# Patient Record
Sex: MALE | Race: WHITE | Employment: FULL TIME | URBAN - METROPOLITAN AREA
[De-identification: names, ages, dates, MRNs, and addresses within clinical notes are randomized per-mention and may not be internally consistent; named-entity substitution may affect disease eponyms.]

---

## 2019-10-30 ENCOUNTER — HOSPITAL ENCOUNTER (OUTPATIENT)
Age: 29
Discharge: HOME | End: 2019-10-30
Attending: FAMILY MEDICINE
Payer: COMMERCIAL

## 2019-10-30 VITALS
HEART RATE: 69 BPM | WEIGHT: 295 LBS | OXYGEN SATURATION: 100 % | SYSTOLIC BLOOD PRESSURE: 126 MMHG | RESPIRATION RATE: 17 BRPM | TEMPERATURE: 97 F | DIASTOLIC BLOOD PRESSURE: 85 MMHG | BODY MASS INDEX: 39.96 KG/M2 | HEIGHT: 72 IN

## 2019-10-30 DIAGNOSIS — R10.9 ABDOMINAL PAIN OF UNKNOWN ETIOLOGY: Primary | ICD-10-CM

## 2019-10-30 PROCEDURE — 99202 OFFICE O/P NEW SF 15 MIN: CPT

## 2019-10-30 NOTE — ED PROVIDER NOTES
Patient Seen in: [address]      History   Patient presents with:  Nausea/Vomiting/Diarrhea (gastrointestinal)    Stated Complaint: diarrhea    HPI    Pt is here with symptoms of chills, feeling dizzy and diarrhea since oropharyngeal exudate. Eyes:      General: No scleral icterus. Extraocular Movements: Extraocular movements intact. Cardiovascular:      Rate and Rhythm: Normal rate and regular rhythm. Heart sounds: Normal heart sounds. No murmur. No gallop.

## 2019-10-30 NOTE — ED INITIAL ASSESSMENT (HPI)
Pt presents to the IC with c/o dizziness, chills/hot flashes, since Sunday. Pt notes upper abd pain that started while driving to work this morning. Diarrhea x4 episodes yesterday, none today. +nausea without emesis. abd pain is resolved.

## 2022-03-08 ENCOUNTER — HOSPITAL ENCOUNTER (OUTPATIENT)
Age: 32
Discharge: HOME | End: 2022-03-08
Payer: COMMERCIAL

## 2022-03-08 VITALS
HEART RATE: 76 BPM | OXYGEN SATURATION: 99 % | BODY MASS INDEX: 30.48 KG/M2 | WEIGHT: 225 LBS | TEMPERATURE: 98 F | RESPIRATION RATE: 18 BRPM | SYSTOLIC BLOOD PRESSURE: 146 MMHG | HEIGHT: 72 IN | DIASTOLIC BLOOD PRESSURE: 91 MMHG

## 2022-03-08 DIAGNOSIS — K64.9 HEMORRHOIDS, UNSPECIFIED HEMORRHOID TYPE: Primary | ICD-10-CM

## 2022-03-08 PROCEDURE — 99203 OFFICE O/P NEW LOW 30 MIN: CPT | Performed by: NURSE PRACTITIONER

## 2022-03-08 RX ORDER — DOCUSATE SODIUM 100 MG/1
100 CAPSULE, LIQUID FILLED ORAL 2 TIMES DAILY
Qty: 20 CAPSULE | Refills: 0 | Status: SHIPPED | OUTPATIENT
Start: 2022-03-08 | End: 2022-03-18

## 2022-03-08 RX ORDER — FLUOXETINE HYDROCHLORIDE 60 MG/1
TABLET, FILM COATED ORAL; ORAL
COMMUNITY

## 2022-03-10 ENCOUNTER — OFFICE VISIT (OUTPATIENT)
Dept: SURGERY | Facility: CLINIC | Age: 32
End: 2022-03-10
Payer: COMMERCIAL

## 2022-03-10 VITALS — TEMPERATURE: 97 F

## 2022-03-10 DIAGNOSIS — K64.5 THROMBOSED EXTERNAL HEMORRHOID: Primary | ICD-10-CM

## 2022-03-10 DIAGNOSIS — K62.89 RECTAL PAIN: ICD-10-CM

## 2022-03-10 PROCEDURE — 46320 REMOVAL OF HEMORRHOID CLOT: CPT | Performed by: SURGERY

## 2022-03-10 PROCEDURE — 46600 DIAGNOSTIC ANOSCOPY SPX: CPT | Performed by: SURGERY

## 2022-03-10 PROCEDURE — 99204 OFFICE O/P NEW MOD 45 MIN: CPT | Performed by: SURGERY

## 2022-03-16 NOTE — PROCEDURES
Procedure note    Date of procedure-March 10, 2022    Preoperative diagnosis-large complex thrombosed external hemorrhoid    Indication for procedure-rectal pain, thrombosed hemorrhoid    Postoperative diagnosis-same    Procedure-   incision, drainage, debridement thrombosed hemorrhoidal complex in the right posterior lateral quadrant    Anesthesia- local    Surgeon- Jessica Helm    Discussed with Patient-the potential risks and benefits of the procedure were reviewed in detail with the patient including but not limited to bleeding, infection, seroma hematoma formation, possibility of recurrence, possible need for further intervention pending clinical course. These another potential intraoperative and perioperative risks were reviewed. The patient and any family members that are present have no further questions at this time and do wish to proceed as discussed. Summary of procedure-the patient was placed on the examination table in a prone position. The buttocks were spread. A large complex of thrombosed hemorrhoids was noted in the right posterior lateral quadrant. The area was infiltrated with 1% lidocaine with epinephrine. The thrombosed hemorrhoid was then incised using an 11 blade. A large amount of thrombus was retrieved. The patient tolerated procedure well. Compression was used for hemostasis. The patient did tolerate the procedure well. Discharge instructions were given to the patient as well as any family members present. They do not have any further questions at this time. Follow-up directions were also given. The patient was discharged from the office in good condition.     Lalito Darling M.D.

## 2022-03-24 ENCOUNTER — OFFICE VISIT (OUTPATIENT)
Dept: SURGERY | Facility: CLINIC | Age: 32
End: 2022-03-24
Payer: COMMERCIAL

## 2022-03-24 VITALS — TEMPERATURE: 98 F | HEIGHT: 72 IN | WEIGHT: 225 LBS | BODY MASS INDEX: 30.48 KG/M2

## 2022-03-24 DIAGNOSIS — K62.89 RECTAL PAIN: ICD-10-CM

## 2022-03-24 DIAGNOSIS — K64.5 THROMBOSED EXTERNAL HEMORRHOID: Primary | ICD-10-CM

## 2022-03-24 DIAGNOSIS — Z80.0 FAMILY HISTORY OF COLON CANCER IN FATHER: ICD-10-CM

## 2022-03-24 PROCEDURE — 3008F BODY MASS INDEX DOCD: CPT | Performed by: SURGERY

## 2022-03-24 PROCEDURE — 99214 OFFICE O/P EST MOD 30 MIN: CPT | Performed by: SURGERY

## (undated) NOTE — LETTER
03/15/22    Patient: Tanja Patino  : 1990 Visit date: 3/10/2022    Dear  Rosio Magana MD    Thank you for referring Tanja Patino to my practice. Please find my assessment and plan below.           Sincerely,       Maxie Meckel, MD   CC: No Recipients